# Patient Record
Sex: MALE
[De-identification: names, ages, dates, MRNs, and addresses within clinical notes are randomized per-mention and may not be internally consistent; named-entity substitution may affect disease eponyms.]

---

## 2024-01-08 ENCOUNTER — APPOINTMENT (OUTPATIENT)
Dept: RADIOLOGY | Facility: CLINIC | Age: 59
End: 2024-01-08

## 2024-01-08 ENCOUNTER — APPOINTMENT (OUTPATIENT)
Dept: OTHER | Facility: CLINIC | Age: 59
End: 2024-01-08
Payer: COMMERCIAL

## 2024-01-08 VITALS
TEMPERATURE: 98.1 F | WEIGHT: 203 LBS | HEART RATE: 77 BPM | DIASTOLIC BLOOD PRESSURE: 87 MMHG | HEIGHT: 69 IN | SYSTOLIC BLOOD PRESSURE: 134 MMHG | OXYGEN SATURATION: 97 % | BODY MASS INDEX: 30.07 KG/M2

## 2024-01-08 DIAGNOSIS — Z03.89 ENCOUNTER FOR OBSERVATION FOR OTHER SUSPECTED DISEASES AND CONDITIONS RULED OUT: ICD-10-CM

## 2024-01-08 DIAGNOSIS — Z87.891 PERSONAL HISTORY OF NICOTINE DEPENDENCE: ICD-10-CM

## 2024-01-08 DIAGNOSIS — Z04.9 ENCOUNTER FOR EXAMINATION AND OBSERVATION FOR UNSPECIFIED REASON: ICD-10-CM

## 2024-01-08 DIAGNOSIS — J31.0 CHRONIC RHINITIS: ICD-10-CM

## 2024-01-08 PROCEDURE — 71046 X-RAY EXAM CHEST 2 VIEWS: CPT

## 2024-01-08 PROCEDURE — 99203 OFFICE O/P NEW LOW 30 MIN: CPT | Mod: 25

## 2024-01-08 PROCEDURE — 99386 PREV VISIT NEW AGE 40-64: CPT | Mod: 25

## 2024-01-08 RX ORDER — CETIRIZINE HYDROCHLORIDE 10 MG/1
10 CAPSULE, LIQUID FILLED ORAL
Refills: 0 | Status: ACTIVE | COMMUNITY
Start: 2024-01-08

## 2024-01-08 RX ORDER — FLUTICASONE PROPIONATE 50 UG/1
50 SPRAY, METERED NASAL
Refills: 0 | Status: ACTIVE | COMMUNITY
Start: 2024-01-08

## 2024-01-08 NOTE — PHYSICAL EXAM
[Outer Ear] : the ears and nose were normal in appearance [Both Tympanic Membranes Were Examined] : both tympanic membranes were normal [Neck Appearance] : the appearance of the neck was normal [Neck Cervical Mass (___cm)] : no neck mass was observed [] : no respiratory distress [Respiration, Rhythm And Depth] : normal respiratory rhythm and effort [Auscultation Breath Sounds / Voice Sounds] : lungs were clear to auscultation bilaterally [Apical Impulse] : the apical impulse was normal [Heart Rate And Rhythm] : heart rate was normal and rhythm regular [Heart Sounds] : normal S1 and S2 [Edema] : there was no peripheral edema [Bowel Sounds] : normal bowel sounds [Abdomen Soft] : soft [Abdomen Tenderness] : non-tender [Abnormal Walk] : normal gait [Skin Color & Pigmentation] : normal skin color and pigmentation [Skin Turgor] : normal skin turgor [Cranial Nerves] : cranial nerves 2-12 were intact [Oriented To Time, Place, And Person] : oriented to person, place, and time

## 2024-01-08 NOTE — ASSESSMENT
[FreeTextEntry1] : A/P: CXR, PFT, CBC, CMP, lipids, UA ordered -flu shot declined - chronic rhinitis, GERD will submit to [facility] -will send for sleep study after cert -RTC 1 year or sooner if needed.

## 2024-01-08 NOTE — PAST MEDICAL HISTORY
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

## 2024-01-08 NOTE — ASSESSMENT
[FreeTextEntry1] : A/P: CXR, PFT, CBC, CMP, lipids, UA ordered -flu shot declined - chronic rhinitis, GERD will submit to [facility] -will send for sleep study after cert -RTC 1 year or sooner if needed.

## 2024-01-08 NOTE — DISCUSSION/SUMMARY
[FreeTextEntry3] : HPI   58 y M presents to [facility] for his  initial health exam.  RESP:NO  cough, wheezing, SOB, Chest tightness   CARDIAC: no concerns  AERODIGESTIVE: Pt reports since 01/2002 he has been experiencing runny/stuffy nose; blowing nose more often; and/or postnasal drip he was following with PMD who had given him rx fluticasone nasal sprays and now he buys them otc and takes zyrtec. He does report to have occasional nose bleeds when blows his nose   SLEEP: heavy snoring, nocturnal awaking, witnessed apnea  never tested for MARC  GI: Pt reports to have frequent heartburn, indigestion since also around 01/2002. He thought is was normal in the beginning didn't think to mention it to anyone. He reports as the years went on his symptoms have become worse he tries to drink milk and avoid certain foods that alleviate his symptoms   pt reports to have eczema since 9/11/01  PCP:  Dr. 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   Results: Imaging: cxry ordered  Spirometry:  done today   A/P:  CXR, PFT, CBC, CMP, lipids, UA ordered -flu shot declined - chronic rhinitis, GERD will submit to  [facility] -will send for sleep study after cert  -RTC 1 year or sooner if needed

## 2024-01-08 NOTE — HEALTH RISK ASSESSMENT
[Patient reported colonoscopy was normal] : Patient reported colonoscopy was normal [ColonoscopyDate] : 2023

## 2024-01-08 NOTE — REVIEW OF SYSTEMS
[Fever] : no fever [Chills] : no chills [Eye Pain] : eye pain [Red Eyes] : eyes not red [Earache] : no earache [Loss Of Hearing] : no hearing loss [Nosebleeds] : nosebleeds [Nasal Discharge] : nasal discharge [Sore Throat] : sore throat [Chest Pain] : no chest pain [Palpitations] : no palpitations [Shortness Of Breath] : no shortness of breath [Cough] : no cough [Wheezing] : no wheezing [SOB on Exertion] : no shortness of breath during exertion [Abdominal Pain] : no abdominal pain [Constipation] : no constipation [Heartburn] : heartburn [Joint Pain] : no joint pain [see HPI] : see HPI [Skin Lesions] : skin lesion [Suicidal] : not suicidal

## 2024-01-08 NOTE — HISTORY OF PRESENT ILLNESS
[FreeTextEntry1] : HPI 58 y M presents to [facility] for his initial health exam.  RESP:NO cough, wheezing, SOB, Chest tightness  CARDIAC: no concerns  AERODIGESTIVE: Pt reports since 01/2002 he has been experiencing runny/stuffy nose; blowing nose more often; and/or postnasal drip he was following with PMD who had given him rx fluticasone nasal sprays and now he buys them otc and takes zyrtec. He does report to have occasional nose bleeds when blows his nose  SLEEP: heavy snoring, nocturnal awaking, witnessed apnea never tested for MARC  GI: Pt reports to have frequent heartburn, indigestion since also around 01/2002. He thought is was normal in the beginning didn't think to mention it to anyone. He reports as the years went on his symptoms have become worse he tries to drink milk and avoid certain foods that alleviate his symptoms  pt reports to have eczema since 9/11/01

## 2024-01-08 NOTE — DISCUSSION/SUMMARY
[FreeTextEntry3] : HPI   58 y M presents to [facility] for his  initial health exam.  RESP:NO  cough, wheezing, SOB, Chest tightness   CARDIAC: no concerns  AERODIGESTIVE: Pt reports since 01/2002 he has been experiencing runny/stuffy nose; blowing nose more often; and/or postnasal drip he was following with PMD who had given him rx fluticasone nasal sprays and now he buys them otc and takes zyrtec. He does report to have occasional nose bleeds when blows his nose   SLEEP: heavy snoring, nocturnal awaking, witnessed apnea  never tested for MARC  GI: Pt reports to have frequent heartburn, indigestion since also around 01/2002. He thought is was normal in the beginning didn't think to mention it to anyone. He reports as the years went on his symptoms have become worse he tries to drink milk and avoid certain foods that alleviate his symptoms   pt reports to have eczema since 9/11/01  PCP:  Dr. 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   Results: Imaging: cxry ordered  Spirometry:  done today   A/P:  CXR, PFT, CBC, CMP, lipids, UA ordered -flu shot declined - chronic rhinitis, GERD will submit to  [facility] -will send for sleep study after cert  -RTC 1 year or sooner if needed

## 2024-01-08 NOTE — PAST MEDICAL HISTORY
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

## 2024-01-08 NOTE — HISTORY OF PRESENT ILLNESS
[FreeTextEntry1] : HPI 58 y M presents to [facility] for his initial health exam.  RESP:NO cough, wheezing, SOB, Chest tightness  CARDIAC: no concerns  AERODIGESTIVE: Pt reports since 01/2002 he has been experiencing runny/stuffy nose; blowing nose more often; and/or postnasal drip he was following with PMD who had given him rx fluticasone nasal sprays and now he buys them otc and takes zyrtec. He does report to have occasional nose bleeds when blows his nose  SLEEP: heavy snoring, nocturnal awaking, witnessed apnea never tested for MARC  GI: Pt reports to have frequent heartburn, indigestion since also around 01/2002. He thought is was normal in the beginning didn't think to mention it to anyone. He reports as the years went on his symptoms have become worse he tries to drink milk and avoid certain foods that alleviate his symptoms  pt reports to have eczema since 9/11/01

## 2024-01-09 LAB
ALBUMIN SERPL ELPH-MCNC: 4.4 G/DL
ALP BLD-CCNC: 92 U/L
ALT SERPL-CCNC: 30 U/L
ANION GAP SERPL CALC-SCNC: 10 MMOL/L
APPEARANCE: CLEAR
AST SERPL-CCNC: 23 U/L
BACTERIA: NEGATIVE /HPF
BASOPHILS # BLD AUTO: 0.02 K/UL
BASOPHILS NFR BLD AUTO: 0.4 %
BILIRUB SERPL-MCNC: 0.2 MG/DL
BILIRUBIN URINE: NEGATIVE
BLOOD URINE: NEGATIVE
BUN SERPL-MCNC: 13 MG/DL
CALCIUM SERPL-MCNC: 9.6 MG/DL
CAST: 0 /LPF
CHLORIDE SERPL-SCNC: 103 MMOL/L
CHOLEST SERPL-MCNC: 211 MG/DL
CO2 SERPL-SCNC: 25 MMOL/L
COLOR: YELLOW
CREAT SERPL-MCNC: 1.37 MG/DL
EGFR: 59 ML/MIN/1.73M2
EOSINOPHIL # BLD AUTO: 0.08 K/UL
EOSINOPHIL NFR BLD AUTO: 1.6 %
EPITHELIAL CELLS: 0 /HPF
GLUCOSE QUALITATIVE U: NEGATIVE MG/DL
GLUCOSE SERPL-MCNC: 94 MG/DL
HCT VFR BLD CALC: 45.6 %
HDLC SERPL-MCNC: 37 MG/DL
HGB BLD-MCNC: 14.7 G/DL
IMM GRANULOCYTES NFR BLD AUTO: 0.2 %
KETONES URINE: NEGATIVE MG/DL
LDLC SERPL CALC-MCNC: 126 MG/DL
LEUKOCYTE ESTERASE URINE: NEGATIVE
LYMPHOCYTES # BLD AUTO: 2.13 K/UL
LYMPHOCYTES NFR BLD AUTO: 43.5 %
MAN DIFF?: NORMAL
MCHC RBC-ENTMCNC: 30.6 PG
MCHC RBC-ENTMCNC: 32.2 GM/DL
MCV RBC AUTO: 95 FL
MICROSCOPIC-UA: NORMAL
MONOCYTES # BLD AUTO: 0.48 K/UL
MONOCYTES NFR BLD AUTO: 9.8 %
NEUTROPHILS # BLD AUTO: 2.18 K/UL
NEUTROPHILS NFR BLD AUTO: 44.5 %
NITRITE URINE: NEGATIVE
NONHDLC SERPL-MCNC: 175 MG/DL
PH URINE: 6
PLATELET # BLD AUTO: 270 K/UL
POTASSIUM SERPL-SCNC: 4.4 MMOL/L
PROT SERPL-MCNC: 6.9 G/DL
PROTEIN URINE: NEGATIVE MG/DL
RBC # BLD: 4.8 M/UL
RBC # FLD: 14.1 %
RED BLOOD CELLS URINE: 0 /HPF
SODIUM SERPL-SCNC: 138 MMOL/L
SPECIFIC GRAVITY URINE: 1.01
TRIGL SERPL-MCNC: 275 MG/DL
UROBILINOGEN URINE: 0.2 MG/DL
WBC # FLD AUTO: 4.9 K/UL
WHITE BLOOD CELLS URINE: 0 /HPF

## 2025-06-30 ENCOUNTER — APPOINTMENT (OUTPATIENT)
Dept: OTHER | Facility: CLINIC | Age: 60
End: 2025-06-30